# Patient Record
Sex: FEMALE | Race: WHITE
[De-identification: names, ages, dates, MRNs, and addresses within clinical notes are randomized per-mention and may not be internally consistent; named-entity substitution may affect disease eponyms.]

---

## 2019-03-18 ENCOUNTER — HOSPITAL ENCOUNTER (INPATIENT)
Dept: HOSPITAL 75 - LDRP | Age: 19
LOS: 1 days | Discharge: HOME | End: 2019-03-19
Attending: OBSTETRICS & GYNECOLOGY | Admitting: OBSTETRICS & GYNECOLOGY
Payer: MEDICAID

## 2019-03-18 VITALS — DIASTOLIC BLOOD PRESSURE: 71 MMHG | SYSTOLIC BLOOD PRESSURE: 114 MMHG

## 2019-03-18 VITALS — DIASTOLIC BLOOD PRESSURE: 65 MMHG | SYSTOLIC BLOOD PRESSURE: 115 MMHG

## 2019-03-18 VITALS — SYSTOLIC BLOOD PRESSURE: 107 MMHG | DIASTOLIC BLOOD PRESSURE: 69 MMHG

## 2019-03-18 VITALS — DIASTOLIC BLOOD PRESSURE: 63 MMHG | SYSTOLIC BLOOD PRESSURE: 114 MMHG

## 2019-03-18 VITALS — SYSTOLIC BLOOD PRESSURE: 109 MMHG | DIASTOLIC BLOOD PRESSURE: 65 MMHG

## 2019-03-18 VITALS — DIASTOLIC BLOOD PRESSURE: 68 MMHG | SYSTOLIC BLOOD PRESSURE: 117 MMHG

## 2019-03-18 VITALS — DIASTOLIC BLOOD PRESSURE: 62 MMHG | SYSTOLIC BLOOD PRESSURE: 109 MMHG

## 2019-03-18 VITALS — DIASTOLIC BLOOD PRESSURE: 68 MMHG | SYSTOLIC BLOOD PRESSURE: 107 MMHG

## 2019-03-18 VITALS — DIASTOLIC BLOOD PRESSURE: 77 MMHG | SYSTOLIC BLOOD PRESSURE: 116 MMHG

## 2019-03-18 VITALS — SYSTOLIC BLOOD PRESSURE: 120 MMHG | DIASTOLIC BLOOD PRESSURE: 72 MMHG

## 2019-03-18 VITALS — DIASTOLIC BLOOD PRESSURE: 57 MMHG | SYSTOLIC BLOOD PRESSURE: 104 MMHG

## 2019-03-18 VITALS — SYSTOLIC BLOOD PRESSURE: 111 MMHG | DIASTOLIC BLOOD PRESSURE: 55 MMHG

## 2019-03-18 VITALS — SYSTOLIC BLOOD PRESSURE: 112 MMHG | DIASTOLIC BLOOD PRESSURE: 59 MMHG

## 2019-03-18 VITALS — SYSTOLIC BLOOD PRESSURE: 107 MMHG | DIASTOLIC BLOOD PRESSURE: 62 MMHG

## 2019-03-18 VITALS — DIASTOLIC BLOOD PRESSURE: 59 MMHG | SYSTOLIC BLOOD PRESSURE: 103 MMHG

## 2019-03-18 VITALS — DIASTOLIC BLOOD PRESSURE: 56 MMHG | SYSTOLIC BLOOD PRESSURE: 101 MMHG

## 2019-03-18 VITALS — DIASTOLIC BLOOD PRESSURE: 72 MMHG | SYSTOLIC BLOOD PRESSURE: 111 MMHG

## 2019-03-18 VITALS — DIASTOLIC BLOOD PRESSURE: 68 MMHG | SYSTOLIC BLOOD PRESSURE: 111 MMHG

## 2019-03-18 VITALS — DIASTOLIC BLOOD PRESSURE: 67 MMHG | SYSTOLIC BLOOD PRESSURE: 117 MMHG

## 2019-03-18 VITALS — SYSTOLIC BLOOD PRESSURE: 83 MMHG | DIASTOLIC BLOOD PRESSURE: 40 MMHG

## 2019-03-18 VITALS — SYSTOLIC BLOOD PRESSURE: 113 MMHG | DIASTOLIC BLOOD PRESSURE: 66 MMHG

## 2019-03-18 VITALS — DIASTOLIC BLOOD PRESSURE: 46 MMHG | SYSTOLIC BLOOD PRESSURE: 92 MMHG

## 2019-03-18 VITALS — DIASTOLIC BLOOD PRESSURE: 63 MMHG | SYSTOLIC BLOOD PRESSURE: 117 MMHG

## 2019-03-18 VITALS — SYSTOLIC BLOOD PRESSURE: 111 MMHG | DIASTOLIC BLOOD PRESSURE: 69 MMHG

## 2019-03-18 VITALS — SYSTOLIC BLOOD PRESSURE: 115 MMHG | DIASTOLIC BLOOD PRESSURE: 66 MMHG

## 2019-03-18 VITALS — SYSTOLIC BLOOD PRESSURE: 113 MMHG | DIASTOLIC BLOOD PRESSURE: 68 MMHG

## 2019-03-18 VITALS — DIASTOLIC BLOOD PRESSURE: 64 MMHG | SYSTOLIC BLOOD PRESSURE: 110 MMHG

## 2019-03-18 VITALS — DIASTOLIC BLOOD PRESSURE: 69 MMHG | SYSTOLIC BLOOD PRESSURE: 121 MMHG

## 2019-03-18 VITALS — HEIGHT: 66 IN | WEIGHT: 213.01 LBS | BODY MASS INDEX: 34.23 KG/M2

## 2019-03-18 VITALS — SYSTOLIC BLOOD PRESSURE: 115 MMHG | DIASTOLIC BLOOD PRESSURE: 69 MMHG

## 2019-03-18 VITALS — SYSTOLIC BLOOD PRESSURE: 114 MMHG | DIASTOLIC BLOOD PRESSURE: 68 MMHG

## 2019-03-18 VITALS — SYSTOLIC BLOOD PRESSURE: 112 MMHG | DIASTOLIC BLOOD PRESSURE: 57 MMHG

## 2019-03-18 VITALS — DIASTOLIC BLOOD PRESSURE: 70 MMHG | SYSTOLIC BLOOD PRESSURE: 118 MMHG

## 2019-03-18 VITALS — DIASTOLIC BLOOD PRESSURE: 52 MMHG | SYSTOLIC BLOOD PRESSURE: 101 MMHG

## 2019-03-18 VITALS — DIASTOLIC BLOOD PRESSURE: 65 MMHG | SYSTOLIC BLOOD PRESSURE: 111 MMHG

## 2019-03-18 VITALS — SYSTOLIC BLOOD PRESSURE: 109 MMHG | DIASTOLIC BLOOD PRESSURE: 58 MMHG

## 2019-03-18 VITALS — DIASTOLIC BLOOD PRESSURE: 76 MMHG | SYSTOLIC BLOOD PRESSURE: 117 MMHG

## 2019-03-18 VITALS — SYSTOLIC BLOOD PRESSURE: 101 MMHG | DIASTOLIC BLOOD PRESSURE: 59 MMHG

## 2019-03-18 VITALS — DIASTOLIC BLOOD PRESSURE: 60 MMHG | SYSTOLIC BLOOD PRESSURE: 108 MMHG

## 2019-03-18 VITALS — SYSTOLIC BLOOD PRESSURE: 114 MMHG | DIASTOLIC BLOOD PRESSURE: 67 MMHG

## 2019-03-18 VITALS — DIASTOLIC BLOOD PRESSURE: 65 MMHG | SYSTOLIC BLOOD PRESSURE: 105 MMHG

## 2019-03-18 VITALS — SYSTOLIC BLOOD PRESSURE: 112 MMHG | DIASTOLIC BLOOD PRESSURE: 71 MMHG

## 2019-03-18 DIAGNOSIS — Z3A.39: ICD-10-CM

## 2019-03-18 LAB
APTT PPP: YELLOW S
BACTERIA #/AREA URNS HPF: (no result) /HPF
BASOPHILS # BLD AUTO: 0 10^3/UL (ref 0–0.1)
BASOPHILS NFR BLD AUTO: 0 % (ref 0–10)
BILIRUB UR QL STRIP: NEGATIVE
EOSINOPHIL # BLD AUTO: 0.3 10^3/UL (ref 0–0.3)
EOSINOPHIL NFR BLD AUTO: 3 % (ref 0–10)
ERYTHROCYTE [DISTWIDTH] IN BLOOD BY AUTOMATED COUNT: 12.6 % (ref 10–14.5)
FIBRINOGEN PPP-MCNC: CLEAR MG/DL
GLUCOSE UR STRIP-MCNC: NEGATIVE MG/DL
HCT VFR BLD CALC: 34 % (ref 35–52)
HGB BLD-MCNC: 11.6 G/DL (ref 11.5–16)
KETONES UR QL STRIP: NEGATIVE
LEUKOCYTE ESTERASE UR QL STRIP: (no result)
LYMPHOCYTES # BLD AUTO: 3 X 10^3 (ref 1–4)
LYMPHOCYTES NFR BLD AUTO: 27 % (ref 12–44)
MANUAL DIFFERENTIAL PERFORMED BLD QL: NO
MCH RBC QN AUTO: 29 PG (ref 25–34)
MCHC RBC AUTO-ENTMCNC: 34 G/DL (ref 32–36)
MCV RBC AUTO: 84 FL (ref 80–99)
MONOCYTES # BLD AUTO: 1 X 10^3 (ref 0–1)
MONOCYTES NFR BLD AUTO: 9 % (ref 0–12)
NEUTROPHILS # BLD AUTO: 7 X 10^3 (ref 1.8–7.8)
NEUTROPHILS NFR BLD AUTO: 61 % (ref 42–75)
NITRITE UR QL STRIP: NEGATIVE
PH UR STRIP: 6.5 [PH] (ref 5–9)
PLATELET # BLD: 329 10^3/UL (ref 130–400)
PMV BLD AUTO: 10.6 FL (ref 7.4–10.4)
PROT UR QL STRIP: (no result)
RBC #/AREA URNS HPF: (no result) /HPF
SP GR UR STRIP: 1.01 (ref 1.02–1.02)
UROBILINOGEN UR-MCNC: 1 MG/DL
WBC # BLD AUTO: 11.4 10^3/UL (ref 4.3–11)
WBC #/AREA URNS HPF: (no result) /HPF

## 2019-03-18 PROCEDURE — 85025 COMPLETE CBC W/AUTO DIFF WBC: CPT

## 2019-03-18 PROCEDURE — 90715 TDAP VACCINE 7 YRS/> IM: CPT

## 2019-03-18 PROCEDURE — 81000 URINALYSIS NONAUTO W/SCOPE: CPT

## 2019-03-18 PROCEDURE — 86900 BLOOD TYPING SEROLOGIC ABO: CPT

## 2019-03-18 PROCEDURE — 3E033VJ INTRODUCTION OF OTHER HORMONE INTO PERIPHERAL VEIN, PERCUTANEOUS APPROACH: ICD-10-PCS | Performed by: OBSTETRICS & GYNECOLOGY

## 2019-03-18 PROCEDURE — 86901 BLOOD TYPING SEROLOGIC RH(D): CPT

## 2019-03-18 PROCEDURE — 90686 IIV4 VACC NO PRSV 0.5 ML IM: CPT

## 2019-03-18 PROCEDURE — 90471 IMMUNIZATION ADMIN: CPT

## 2019-03-18 PROCEDURE — 36415 COLL VENOUS BLD VENIPUNCTURE: CPT

## 2019-03-18 PROCEDURE — 86850 RBC ANTIBODY SCREEN: CPT

## 2019-03-18 RX ADMIN — IBUPROFEN SCH MG: 800 TABLET, FILM COATED ORAL at 22:10

## 2019-03-18 RX ADMIN — DOCUSATE SODIUM SCH MG: 100 CAPSULE ORAL at 22:10

## 2019-03-18 RX ADMIN — SODIUM CHLORIDE, SODIUM LACTATE, POTASSIUM CHLORIDE, AND CALCIUM CHLORIDE SCH MLS/HR: 600; 310; 30; 20 INJECTION, SOLUTION INTRAVENOUS at 05:48

## 2019-03-18 RX ADMIN — SODIUM CHLORIDE, SODIUM LACTATE, POTASSIUM CHLORIDE, AND CALCIUM CHLORIDE SCH MLS/HR: 600; 310; 30; 20 INJECTION, SOLUTION INTRAVENOUS at 11:36

## 2019-03-18 RX ADMIN — IBUPROFEN SCH MG: 800 TABLET, FILM COATED ORAL at 15:00

## 2019-03-18 NOTE — NUR
pt transferred to room 312 via w/c with standby assist x2. Rt.leg remains heavy, unable to 
move without assist.

## 2019-03-18 NOTE — NUR
FFu/1. moderate-lt rubra noted. no clots expressed. cory-care offered. v-pad in place.  Lt. 
leg remains heavy. denies c/o's @ time.

## 2019-03-18 NOTE — HISTORY & PHYSICAL-OB
OB - Chief Complaint & HPI


Date/Time


Date of Admission:


Date of Admission:  Mar 18, 2019 at 04:30


Date seen by a Provider:  Mar 18, 2019


Time Seen by a Provider:  07:35





Chief Complaint/History


OB-Reason for Admission/Chief:  Induction of Labor


Hx :  2


Hx Para:  1


Expected Date of Delivery:  Mar 23, 2019


Gestational Age in Weeks:  39


Indication for induction:  other


Admission Nurse Assessment Rev:  Yes





Allergies and Home Medications


Allergies


Coded Allergies:  


     No Known Drug Allergies (Unverified , 3/18/19)





Patient Home Medication List


Home Medication List Reviewed:  Yes





OB - History


Hx of Present Pregnancy


Prenatal Care:  Yes


Ultrasounds:  Normal mid trimester US


Obstetrical Complications:  None


Medical Complications:  None





Prenatal Information


Pregnancy Induced Hypertension:  No


Maternal Gestational Diabetes:  No


Postpartum Hemorrhage:  No





Obstetrical History


Hx :  2


Hx Para:  1


Hx # Term Pregnancies:  1


Hx #  Pregnancies:  0


Number of Living Children:  1


Hx Pregnancy Termination:  No


Hx Multiple Gestation:  No


Hx Ectopic Pregnancy:  No


Hx Stillbirth:  No


Hx Pregnancy Complication:  No


Hx Pregnancy Induced Hypertens:  No


Hx Maternal Gestational Diabet:  No


Hx Postpartum Hemorrhage:  No





Delivery History


Hx Dystocia:  No


Hx Forceps Assisted Delivery:  No


Hx Vacuum Extraction Assisted:  No


Hx Placenta Abnormality:  No


Hx Fetal Distress:  No


Hx Large For Gestational Age I:  No


Hx Small for Gestational Age I:  No


Hx  Section:  No


Hx Vaginal Delivery Post C-Sec:  No


Hx Blood Disorders:  No


Adverse Rxn to Tranfusion:  No





Patient Past Medical History





Unremarkable





Social History/Family History


HIV/AIDS:  No


Recent Infectious Disease Expo:  No


Sexually Transmitted Disease:  No


Alcohol Use:  Denies Use


Recreational Drug Use:  No


Smoking Cessation:  Unknown if ever smoked


2nd Hand Smoke Exposure:  No





Immunizations


Hepatitis A:  No


Hepatitis B:  No


Tetanus Booster (TDap):  Unknown


Rubella:  immune


RPR/VDRL:  Negative


GBS Status:  Negative


HBsAG:  Negative





OB - Admission Exam


Physical Exam


HEENT:  NCAT


Heart:  Rhythm Normal


Lungs:  Clear


Abdomen:  Gravid


Extremities:  Edema (Minimal)


Reflexes:  Normal


Cervical Dilatation:  4cm


Effacement:  75%


Station:  -2


Membranes:  Intact


Amniotic Fluid:  Clear


Fetal Heart Rate:  140's


Accelerations:  Accelerations Present


Decelerations:  No Decelerations


Short Term Variability:  Present


Long Term Variability:  Average (6-25)


Contractions on Admission:  6-10 Minutes Apart


Intensity:  Moderate





Lloyd Scoring Tool (Modified)


Dilation (cm):  3-4cm (2)


Effacement (%):  51-79%  (2)


Fetal Descent/Station:  -2        (1)


Cervix Consistency:  Medium(1)


Cervix Position:  Middle/Mid-Position  (1)


Add 1 point for:  Each previous vaginal delivery (1)


Lloyd Score:  8


Labs





Laboratory Tests








Test


 3/18/19


05:00 3/18/19


05:56 Range/Units


 


 


White Blood Count


 11.4 H


 


 4.3-11.0


10^3/uL


 


Red Blood Count


 4.05 L


 


 4.35-5.85


10^6/uL


 


Hemoglobin 11.6   11.5-16.0  G/DL


 


Hematocrit 34 L  35-52  %


 


Mean Corpuscular Volume 84   80-99  FL


 


Mean Corpuscular Hemoglobin 29   25-34  PG


 


Mean Corpuscular Hemoglobin


Concent 34 


 


 32-36  G/DL





 


Red Cell Distribution Width 12.6   10.0-14.5  %


 


Platelet Count


 329 


 


 130-400


10^3/uL


 


Mean Platelet Volume 10.6 H  7.4-10.4  FL


 


Neutrophils (%) (Auto) 61   42-75  %


 


Lymphocytes (%) (Auto) 27   12-44  %


 


Monocytes (%) (Auto) 9   0-12  %


 


Eosinophils (%) (Auto) 3   0-10  %


 


Basophils (%) (Auto) 0   0-10  %


 


Neutrophils # (Auto) 7.0   1.8-7.8  X 10^3


 


Lymphocytes # (Auto) 3.0   1.0-4.0  X 10^3


 


Monocytes # (Auto) 1.0   0.0-1.0  X 10^3


 


Eosinophils # (Auto)


 0.3 


 


 0.0-0.3


10^3/uL


 


Basophils # (Auto)


 0.0 


 


 0.0-0.1


10^3/uL


 


Urine Color  YELLOW   


 


Urine Clarity  CLEAR   


 


Urine pH  6.5  5-9  


 


Urine Specific Gravity  1.010 L 1.016-1.022  


 


Urine Protein  1+ H NEGATIVE  


 


Urine Glucose (UA)  NEGATIVE  NEGATIVE  


 


Urine Ketones  NEGATIVE  NEGATIVE  


 


Urine Nitrite  NEGATIVE  NEGATIVE  


 


Urine Bilirubin  NEGATIVE  NEGATIVE  


 


Urine Urobilinogen  1  NORMAL  MG/DL


 


Urine Leukocyte Esterase  1+ H NEGATIVE  


 


Urine RBC (Auto)  NEGATIVE  NEGATIVE  


 


Urine RBC  NONE   /HPF


 


Urine WBC  0-2   /HPF


 


Urine Squamous Epithelial


Cells 


 10-25 H


  /HPF





 


Urine Crystals  NONE   /LPF


 


Urine Bacteria  FEW H  /HPF


 


Urine Casts  NONE   /LPF


 


Urine Mucus  NEGATIVE   /LPF


 


Urine Culture Indicated  NO   











OB - Assessment/Plan/Diagnosis


Assessment


Assessment:  induction of labor


Admission Dx


Intrauterine Pregnancy at 39 2/7 weeks


Admission Status:  Inpatient Order (span 2 midnights)


Reason for Inpatient Admission:  


Intrauterine Pregnant expected to deliver today





Plan


Plan:  Induction


Induction Method:  per Pitocin Protocol











SHAD GARCIA DO Mar 18, 2019 07:41

## 2019-03-18 NOTE — OB LABOR & DELIVERY RECORD
Vag Delivery Note


Vag Delivery Note


Date of Delivery: 3/18/19 





Preoperative Diagnosis: Jammie Canales  is a (18  /Para  2 / 1,Gestational 

Age (wks)39with [2/]





Postoperative Diagnosis: Same





Surgeon: SHAD GARCIA 





Assistant: []





Anesthesia: [Epidural]





Delivery Type: [Normal Spontaneous Vaginal Delivery with Midline Episiotomy and 

Standard Repair]





Findings: [Male]


Viable [] infant, apgars [], weight []


Lacerations:


Intact placenta with 3 vessel cord. No nuchal cord, body cord or shoulder 

dystocia





Estimated Blood Loss: [300] ml





Complications: None





Condition: Stable





Description of Procedure:





The patient is a 18 year old female who presented [for induction of labor]. She 

was admitted and informed consent was obtained. Her labor course was 

unremarkable . She progressed to complete dilatation and began to push. 





She was then set up for delivery. The infant's head was delivered 

atraumatically in the [DOMINIC] position. The shoulders and remainder of the infant'

s body were then delivered without difficulty. Upon delivery, the head was held 

below the level of the perineum and the mouth and nares were bulb suctioned. 

The cord was doubly clamped and cut and the infant was handed off to the 

pediatric staff. An intact placenta with 3-vessel cord delivered via Marvin 

and there was found to be minimal bleeding.~ Vigorous fundal massage was 

performed and the fundus was found to be firm. IV oxytocin was given. 

Examination of the vagina and perineum revealed a [midline episiotomy with no 

extension] laceration repaired in the usual fashion with 2-0 and 3-0 vicryl 

suture. Following the repair, sponge, instrument and needle counts were 

correct. Mom and baby were both in stable condition in the labor suite.





Vitals - Labs


Vital Signs - I&O





Vital Signs








  Date Time  Temp Pulse Resp B/P (MAP) Pulse Ox O2 Delivery O2 Flow Rate FiO2


 


3/18/19 07:00  80 18 115/9 (44)  Room Air  


 


3/18/19 06:30  75 18 107/69 (82)  Room Air  


 


3/18/19 06:15  83 18 107/68 (81)  Room Air  


 


3/18/19 06:00  82 18 111/68 (82)  Room Air  


 


3/18/19 05:45 97.9 83 18 115/66 (82)  Room Air  


 


3/18/19 04:55  82 18 117/67 (84)  Room Air  











Labs


Laboratory Tests


3/18/19 05:00: 


White Blood Count 11.4H, Red Blood Count 4.05L, Hemoglobin 11.6, Hematocrit 34L

, Mean Corpuscular Volume 84, Mean Corpuscular Hemoglobin 29, Mean Corpuscular 

Hemoglobin Concent 34, Red Cell Distribution Width 12.6, Platelet Count 329, 

Mean Platelet Volume 10.6H, Neutrophils (%) (Auto) 61, Lymphocytes (%) (Auto) 27

, Monocytes (%) (Auto) 9, Eosinophils (%) (Auto) 3, Basophils (%) (Auto) 0, 

Neutrophils # (Auto) 7.0, Lymphocytes # (Auto) 3.0, Monocytes # (Auto) 1.0, 

Eosinophils # (Auto) 0.3, Basophils # (Auto) 0.0


3/18/19 05:56: 


Urine Color YELLOW, Urine Clarity CLEAR, Urine pH 6.5, Urine Specific Gravity 

1.010L, Urine Protein 1+H, Urine Glucose (UA) NEGATIVE, Urine Ketones NEGATIVE, 

Urine Nitrite NEGATIVE, Urine Bilirubin NEGATIVE, Urine Urobilinogen 1, Urine 

Leukocyte Esterase 1+H, Urine RBC (Auto) NEGATIVE, Urine RBC NONE, Urine WBC 0-2

, Urine Squamous Epithelial Cells 10-25H, Urine Crystals NONE, Urine Bacteria 

FEWH, Urine Casts NONE, Urine Mucus NEGATIVE, Urine Culture Indicated NO











SEALSSHAD DO Mar 18, 2019 12:53

## 2019-03-18 NOTE — NUR
LATRICE ROSAS presented to unit via wheelchair from ED, accompanied by s/o, for INDUCTION. 
LATRICE ROSAS weighed, gowned, voided, and to bed.  EFHM and TOCO applied, VS taken.  
LATRICE ROSAS oriented to bed controls, call light, TV, heat, and A/C controls.

## 2019-03-19 VITALS — SYSTOLIC BLOOD PRESSURE: 111 MMHG | DIASTOLIC BLOOD PRESSURE: 59 MMHG

## 2019-03-19 VITALS — SYSTOLIC BLOOD PRESSURE: 108 MMHG | DIASTOLIC BLOOD PRESSURE: 65 MMHG

## 2019-03-19 VITALS — DIASTOLIC BLOOD PRESSURE: 62 MMHG | SYSTOLIC BLOOD PRESSURE: 100 MMHG

## 2019-03-19 VITALS — SYSTOLIC BLOOD PRESSURE: 113 MMHG | DIASTOLIC BLOOD PRESSURE: 73 MMHG

## 2019-03-19 LAB
BASOPHILS # BLD AUTO: 0 10^3/UL (ref 0–0.1)
BASOPHILS NFR BLD AUTO: 0 % (ref 0–10)
EOSINOPHIL # BLD AUTO: 0.4 10^3/UL (ref 0–0.3)
EOSINOPHIL NFR BLD AUTO: 3 % (ref 0–10)
ERYTHROCYTE [DISTWIDTH] IN BLOOD BY AUTOMATED COUNT: 12.5 % (ref 10–14.5)
HCT VFR BLD CALC: 31 % (ref 35–52)
HGB BLD-MCNC: 10.2 G/DL (ref 11.5–16)
LYMPHOCYTES # BLD AUTO: 3.7 X 10^3 (ref 1–4)
LYMPHOCYTES NFR BLD AUTO: 27 % (ref 12–44)
MANUAL DIFFERENTIAL PERFORMED BLD QL: NO
MCH RBC QN AUTO: 29 PG (ref 25–34)
MCHC RBC AUTO-ENTMCNC: 33 G/DL (ref 32–36)
MCV RBC AUTO: 86 FL (ref 80–99)
MONOCYTES # BLD AUTO: 1.2 X 10^3 (ref 0–1)
MONOCYTES NFR BLD AUTO: 9 % (ref 0–12)
NEUTROPHILS # BLD AUTO: 8.5 X 10^3 (ref 1.8–7.8)
NEUTROPHILS NFR BLD AUTO: 61 % (ref 42–75)
PLATELET # BLD: 261 10^3/UL (ref 130–400)
PMV BLD AUTO: 10.2 FL (ref 7.4–10.4)
WBC # BLD AUTO: 13.9 10^3/UL (ref 4.3–11)

## 2019-03-19 RX ADMIN — IBUPROFEN SCH MG: 800 TABLET, FILM COATED ORAL at 04:30

## 2019-03-19 RX ADMIN — IBUPROFEN SCH MG: 800 TABLET, FILM COATED ORAL at 15:49

## 2019-03-19 RX ADMIN — DOCUSATE SODIUM SCH MG: 100 CAPSULE ORAL at 09:50

## 2019-03-19 RX ADMIN — IBUPROFEN SCH MG: 800 TABLET, FILM COATED ORAL at 09:51

## 2019-03-19 NOTE — DISCHARGE INST-WOMEN'S SERVICE
Discharge Inst-Women's Serv


Depart Medication/Instructions


New, Converted or Re-Newed RX:  RX Given to Pt/Family


Instructions


Pelvic rest x 6 weeks.  No heavy lifting, less than 20 lbs.  No strenuous 

activities.  Call with problems or questions.


Final Diagnosis


Intrauterine Pregnancy at 39 2/7 weeks--delivered





Activity


Activity:  Activity as Tolerated


Driving Instructions:  No Driving for 1 Week


NO SMOKING:  NO SMOKING


Nothing Inside Vagina:  No Douching, No [place], No Tampons





Diet


Discharge Diet:  No Restrictions


Symptoms to Report to :  Bleeding Excessive, Pain Increased, Fever Over 101 

Degrees F, Vaginal Bleeding Increase


For Any Problems or Questions:  Contact Your Physician





Skin/Wound Care


Infection Signs and Symptoms:  Foul Odor of Wound, Increased Drainage, 

Temperature Above 101  F


Operative Area Clean and Dry:  Keep Incision Clean/Dry


Stitches/Staples/Dermabond:  Care of Stitches


Bathing Instructions:  SHAD Stallings DO Mar 19, 2019 06:29

## 2019-03-19 NOTE — NUR
DISCHARGE INSTRUCTIONS REVIEWED WITH COPY TO PT. RXS GIVEN EARLIER FOR SPOUSE TO HAVE 
FILLED. STATES UNDERSTANDING OF ALL INSTRUCTIONS AND NEED TO F/U AS SCHEDULED AND AS NEEDED. 
PT DISMISSED FROM WS IN STABLE CONDITION TO ROOMING IN PARENT R/T INFANT HAVING TO STAY.

## 2019-03-19 NOTE — ANESTHESIA-REGIONAL POST-OP
Regional


Patient Condition


Mental Status:  Alert, Oriented x3


Circulation:  Same as Pre-Op


Headache:  Absent


Sensation:  Full Recovery


Motor Block:  Absent





Post Op Complications


Complications


None





Follow Up Care/Instructions


Patient Instructions


None needed.





Anesthesia/Patient Condition


Patient is doing well, no complaints, stable vital signs, no apparent adverse 

anesthesia problems.











ESTHER DE LEON DO Mar 19, 2019 12:37

## 2019-07-01 ENCOUNTER — HOSPITAL ENCOUNTER (EMERGENCY)
Dept: HOSPITAL 75 - ER FS | Age: 19
Discharge: HOME | End: 2019-07-01
Payer: SELF-PAY

## 2019-07-01 VITALS — BODY MASS INDEX: 29.82 KG/M2 | HEIGHT: 67 IN | WEIGHT: 190 LBS

## 2019-07-01 VITALS — DIASTOLIC BLOOD PRESSURE: 74 MMHG | SYSTOLIC BLOOD PRESSURE: 118 MMHG

## 2019-07-01 DIAGNOSIS — L02.211: ICD-10-CM

## 2019-07-01 DIAGNOSIS — S30.861A: Primary | ICD-10-CM

## 2019-07-01 DIAGNOSIS — Z90.49: ICD-10-CM
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
## 2019-07-01 PROCEDURE — 99283 EMERGENCY DEPT VISIT LOW MDM: CPT

## 2019-07-01 NOTE — ED INTEGUMENTARY GENERAL
General


Chief Complaint:  Bite-Animal/Human/Insect


Stated Complaint:  RT SIDE RIB CAGE BUG BITE/STING


Nursing Triage Note:  


BITE ON RIGHT SIDE, PAINFUL AND RED





History of Present Illness


Date Seen by Provider:  Jul 1, 2019


Time Seen by Provider:  19:21


Initial Comments


Patient believes she was bitten by something on Saturday which would be 2 days 

ago.  She said she felt a pinprick but did not see a spider or bug.  She says 

since that time the wound has become more erythematous and slightly harder and 

now she has a rash on her left arm and chest.  She says the rash is macular 

papular and pruritic.  She says she has had some headache and nausea but no 

fevers vomiting abdominal pain or other systemic symptoms.  She says she is 

healthy and her immunizations are up-to-date.  She says there has been some 

drainage from the wound and has been placing predominant with continued 

yellowish thick drainage although there is no drainage present on my exam.  She 

is in no obvious distress with normal vital signs.





Allergies and Home Medications


Allergies


Coded Allergies:  


     No Known Drug Allergies (Unverified , 3/18/19)





Home Medications


Docusate Sodium 100 Mg Capsule, 100 MG PO BID


   Prescribed by: SHAD GARCIA on 3/19/19 0632


Ibuprofen 800 Mg Tablet, 800 MG PO Q6H PRN for PAIN-MODERATE


   Prescribed by: SHAD GARCIA on 3/19/19 0632


Oxycodone HCl/Acetaminophen 1 Each Tablet, 1 TAB PO Q4HR PRN for PAIN-MODERATE


   Prescribed by: SHAD GARCIA on 3/19/19 0632





Patient Home Medication List


Home Medication List Reviewed:  Yes





Review of Systems


Review of Systems


Constitutional:  no symptoms reported


EENTM:  no symptoms reported


Respiratory:  no symptoms reported


Gastrointestinal:  No abdominal pain; nausea; No vomiting


Genitourinary:  no symptoms reported


Musculoskeletal:  no symptoms reported


Skin:  lesions, rash


Psychiatric/Neurological:  Headache





All Other Systems Reviewed


Negative Unless Noted:  Yes





Past Medical-Social-Family Hx


Patient Social History


2nd Hand Smoke Exposure:  No


Recent Foreign Travel:  No


Contact w/Someone Who Travel:  No


Recent Infectious Disease Expo:  No


Recent Hopitalizations:  No





Immunizations Up To Date


Tetanus Booster (TDap):  Unknown





Seasonal Allergies


Seasonal Allergies:  No





Past Medical History


Surgeries:  Yes (utheral dilitation as child)


Respiratory:  No


Cardiac:  No


Neurological:  No


Sexually Transmitted Disease:  No


HIV/AIDS:  No


Genitourinary:  No


Gastrointestinal:  No


Musculoskeletal:  No


Endocrine:  No


HEENT:  No


Cancer:  No


Psychosocial:  No


Integumentary:  No


Blood Disorders:  No


Adverse Reaction/Blood Tranf:  No





Family Medical History





Diabetes mellitus (MGM)


FH: heart disease (MGM)


HEART





Physical Exam


Vital Signs





Vital Signs - First Documented








 7/1/19





 19:10


 


Temp 98.2


 


Pulse 86


 


Resp 16


 


B/P (MAP) 118/74 (89)


 


Pulse Ox 98


 


O2 Delivery Room Air





Capillary Refill : Less Than 3 Seconds


General Appearance:  WD/WN, no apparent distress


Cardiovascular:  regular rate, rhythm


Respiratory:  no respiratory distress


Gastrointestinal:  normal bowel sounds, non tender, soft


Neurologic/Psychiatric:  alert, normal mood/affect, oriented x 3


Skin:  other (right lateral abdomen has approximate 1 x 1 cm area of induration 

but no drainage noted there is surrounding 5 x 5 cm erythema that is nontender 

to palpation.  Patient has macular papular rash on left inner arm and top of her

left chest as well.)





Progress/Results/Core Measures


Results/Orders


My Orders





Orders - JENNIFER MARTÍNEZ DO


Dexamethasone Tablet (Decadron Tablet) (7/1/19 19:15)





Vital Signs/I&O











 7/1/19





 19:10


 


Temp 98.2


 


Pulse 86


 


Resp 16


 


B/P (MAP) 118/74 (89)


 


Pulse Ox 98


 


O2 Delivery Room Air














Blood Pressure Mean:                    89











Progress


Progress Note :  


Progress Note


Patient with likely black  spider bite versus other insect bite wound 

however based off her feeling a pinprick and the rash present with some headache

and nausea I suspect she has a black  spider bite.  She does not appear 

toxic and she has no abdominal wall pain or rigidity.  She may have a reaction 

to it with possible secondary abscess and cellulitis due to the wound.   I 

recommended incision and drainage to evacuate any further abscess collection but

she refused stating that she rather be started on antibiotics for the abscess.  

I will give her a dose of Decadron here to help reduce systemic inflammation.  I

told her the black  spider bites can sometimes be toxic and required 

antivenom which required lab work and transfer.  She says she has a child home 

and does not want to go through any testing or further treatment at this time 

but would like to try the steroid and antibiotic and then she said she would 

return with any new worsening symptoms.  I told her what symptoms to look out 

for including worsening rash abdominal pain fevers vomiting neck stiffness or 

rigidity.  Patient aware and agreeable with plan for discharge and verbalized 

understanding of the need for short-term follow-up and strict ED return 

precautions discussed as above.





Departure


Impression





   Primary Impression:  


   Insect bite - wound


   Qualified Codes:  S30.861A - Insect bite (nonvenomous) of abdominal wall, 

   initial encounter; W57.XXXA - Bitten or stung by nonvenomous insect and other

   nonvenomous arthropods, initial encounter


   Additional Impressions:  


   Abdominal wall abscess


   Rash and nonspecific skin eruption


Disposition:  01 HOME, SELF-CARE


Condition:  Stable





Departure-Patient Inst.


Referrals:  


NO,LOCAL PHYSICIAN (PCP)


Primary Care Physician








SHAD GARCIA DO (Family)


Primary Care Physician


Patient Instructions:  Insect Bites and Stings





Add. Discharge Instructions:  


All discharge instructions reviewed with patient and/or family. Voiced 

understanding.





Take 600mg of ibuprofen every 6 hours for pain.  You can also take tylenol.  

Warm soaks 4 times daily.  Come back with any new or worsening symptoms.  Thank 

you!


Scripts


Sulfamethoxazole/Trimethoprim (Bactrim Ds Tablet) 1 Each Tablet


1 EACH PO BID, #14 TAB


   Prov: JENNIFER MARTÍNEZ DO         7/1/19











JENNIFER MARTÍNEZ DO              Jul 1, 2019 19:26

## 2019-07-02 NOTE — XMS REPORT
Continuity of Care Document

                             Created on: 2019



Jammie Canales

External Reference #: [id number]

: 2000

Sex: Female



Demographics







                          Address                   [address] [place] [address]  [address]

 

                          Home Phone                [phone] x

 

                          Preferred Language        Unknown

 

                          Marital Status            Unknown

 

                          Muslim Affiliation     Unknown

 

                          Race                      Unknown

 

                          Ethnic Group              Unknown





Author







                          Organization              Unknown

 

                          Address                   Unknown

 

                          Phone                     [phone]



              



Allergies

      



There is no data.                  



Medications

      



There is no data.                  



Problems

      



There is no data.                  



Procedures

      



There is no data.                  



Results

      



There is no data.              



Encounters

      





                ACCT No.              Visit Date/Time              Discharge              Status      

                Pt. Type              Provider              Facility              Loc./Unit      

                                        Complaint        

 

                [id number]              2019 13:30:00              2019 23:59:59              

[facility]              Outpatient              [facility]ANDRE LAC                              [facility]

 [facility]

## 2019-07-02 NOTE — XMS REPORT
[facility]

                             Created on: 2019



Jammie Canales

External Reference #: [id number]

: 2000

Sex: Female



Demographics







                          Address                   [address] [place] [address]  [phone]

 

                          Preferred Language        Unknown

 

                          Marital Status            Unknown

 

                          Buddhist Affiliation     Unknown

 

                          Race                      Unknown

 

                          Ethnic Group              Unknown





Author







                          Author                    SHAD GARCIA

 

                          Organization              [facility]

 

                          Address                   [address] [place]  [address]



 

                          Phone                     [phone]







Care Team Providers







                    Care Team Member Name    Role                Phone

 

                    RADHASHAD       Unavailable         [phone]







PROBLEMS

Unknown Problems



ALLERGIES

No Known Allergies



ENCOUNTERS







                Encounter       Location        Date            Diagnosis

 

                    [address] [address]                                        

 

                    [address] [address]    18 Mar,

 2019                                    

 

                    [address] [address]    12 Mar,

 2019                                   Morbid obesity E66.01 and Encounter for supervision of other normal pregnancy,

 third trimester Z34.83

 

                    [address] [address]    05 Mar,

 2019                                   Morbid obesity E66.01 and Encounter for supervision of other normal pregnancy,

 third trimester Z34.83

 

                    [address] [address]                                       Encounter for supervision of other normal pregnancy, third trimester Z34.83

 and BMI 45.0-49.9, adult Z68.42

 

                    [address] [address]                                        







IMMUNIZATIONS

No Known Immunizations



SOCIAL HISTORY

Never Assessed



REASON FOR VISIT

2 wk ob fu



PLAN OF CARE







                          Activity                  Details









                                         









                          Follow Up                 1 Week Reason:Return Obstetrical Visit







VITAL SIGNS







                    Height              56in in             2019

 

                    Weight              217 lbs             2019

 

                    Temperature         98.2 degrees Fahrenheit    2019

 

                    Heart Rate          78 bpm              2019

 

                    Oximetry            98 %                2019

 

                    BMI                 48.65 kg/m2         2019

 

                    Blood pressure systolic    124 mmHg            2019

 

                    Blood pressure diastolic    78 mmHg             2019







MEDICATIONS

Unknown Medications



RESULTS







                Name            Result          Date            Reference Range

 

                UA OB DIP (IN HOUSE)                                     

 

                Glucose         negative                         

 

                Protein         negative                         







PROCEDURES







                Procedure       Date Ordered    Result          Body Site

 

                URINE-NO MICRO    2019                     







INSTRUCTIONS





MEDICATIONS ADMINISTERED

No Known Medications

## 2020-10-16 ENCOUNTER — HOSPITAL ENCOUNTER (OUTPATIENT)
Dept: HOSPITAL 75 - LAB FS | Age: 20
End: 2020-10-16
Attending: FAMILY MEDICINE
Payer: MEDICAID

## 2020-10-16 DIAGNOSIS — Z34.82: Primary | ICD-10-CM

## 2020-10-16 LAB
BASOPHILS # BLD AUTO: 0 10^3/UL (ref 0–0.1)
BASOPHILS NFR BLD AUTO: 0 % (ref 0–10)
EOSINOPHIL # BLD AUTO: 0.4 10^3/UL (ref 0–0.3)
EOSINOPHIL NFR BLD AUTO: 3 % (ref 0–10)
HCT VFR BLD CALC: 33 % (ref 35–52)
HGB BLD-MCNC: 11 G/DL (ref 11.5–16)
LYMPHOCYTES # BLD AUTO: 2.1 X 10^3 (ref 1–4)
LYMPHOCYTES NFR BLD AUTO: 20 % (ref 12–44)
MANUAL DIFFERENTIAL PERFORMED BLD QL: NO
MCH RBC QN AUTO: 29 PG (ref 25–34)
MCHC RBC AUTO-ENTMCNC: 34 G/DL (ref 32–36)
MCV RBC AUTO: 86 FL (ref 80–99)
MONOCYTES # BLD AUTO: 0.5 X 10^3 (ref 0–1)
MONOCYTES NFR BLD AUTO: 5 % (ref 0–12)
NEUTROPHILS # BLD AUTO: 7.3 X 10^3 (ref 1.8–7.8)
NEUTROPHILS NFR BLD AUTO: 70 % (ref 42–75)
PLATELET # BLD: 313 10^3/UL (ref 130–400)
PMV BLD AUTO: 10.5 FL (ref 7.4–10.4)
WBC # BLD AUTO: 10.4 10^3/UL (ref 4.3–11)

## 2020-10-16 PROCEDURE — 86780 TREPONEMA PALLIDUM: CPT

## 2020-10-16 PROCEDURE — 82950 GLUCOSE TEST: CPT

## 2020-10-16 PROCEDURE — 36415 COLL VENOUS BLD VENIPUNCTURE: CPT

## 2020-10-16 PROCEDURE — 86850 RBC ANTIBODY SCREEN: CPT

## 2020-10-16 PROCEDURE — 85025 COMPLETE CBC W/AUTO DIFF WBC: CPT

## 2020-12-11 ENCOUNTER — HOSPITAL ENCOUNTER (OUTPATIENT)
Dept: HOSPITAL 75 - LABNPT | Age: 20
End: 2020-12-11
Attending: FAMILY MEDICINE
Payer: MEDICAID

## 2020-12-11 DIAGNOSIS — Z34.93: Primary | ICD-10-CM

## 2020-12-11 DIAGNOSIS — Z3A.00: ICD-10-CM

## 2020-12-11 PROCEDURE — 87081 CULTURE SCREEN ONLY: CPT

## 2021-01-04 ENCOUNTER — HOSPITAL ENCOUNTER (INPATIENT)
Dept: HOSPITAL 75 - LDRP | Age: 21
LOS: 1 days | Discharge: HOME | End: 2021-01-05
Attending: FAMILY MEDICINE | Admitting: FAMILY MEDICINE
Payer: MEDICAID

## 2021-01-04 VITALS — SYSTOLIC BLOOD PRESSURE: 69 MMHG | DIASTOLIC BLOOD PRESSURE: 38 MMHG

## 2021-01-04 VITALS — DIASTOLIC BLOOD PRESSURE: 63 MMHG | SYSTOLIC BLOOD PRESSURE: 101 MMHG

## 2021-01-04 VITALS — DIASTOLIC BLOOD PRESSURE: 35 MMHG | SYSTOLIC BLOOD PRESSURE: 71 MMHG

## 2021-01-04 VITALS — DIASTOLIC BLOOD PRESSURE: 64 MMHG | SYSTOLIC BLOOD PRESSURE: 115 MMHG

## 2021-01-04 VITALS — SYSTOLIC BLOOD PRESSURE: 106 MMHG | DIASTOLIC BLOOD PRESSURE: 72 MMHG

## 2021-01-04 VITALS — SYSTOLIC BLOOD PRESSURE: 106 MMHG | DIASTOLIC BLOOD PRESSURE: 70 MMHG

## 2021-01-04 VITALS — SYSTOLIC BLOOD PRESSURE: 110 MMHG | DIASTOLIC BLOOD PRESSURE: 68 MMHG

## 2021-01-04 VITALS — DIASTOLIC BLOOD PRESSURE: 74 MMHG | SYSTOLIC BLOOD PRESSURE: 130 MMHG

## 2021-01-04 VITALS — SYSTOLIC BLOOD PRESSURE: 106 MMHG | DIASTOLIC BLOOD PRESSURE: 62 MMHG

## 2021-01-04 VITALS — SYSTOLIC BLOOD PRESSURE: 106 MMHG | DIASTOLIC BLOOD PRESSURE: 67 MMHG

## 2021-01-04 VITALS — SYSTOLIC BLOOD PRESSURE: 105 MMHG | DIASTOLIC BLOOD PRESSURE: 73 MMHG

## 2021-01-04 VITALS — SYSTOLIC BLOOD PRESSURE: 110 MMHG | DIASTOLIC BLOOD PRESSURE: 63 MMHG

## 2021-01-04 VITALS — SYSTOLIC BLOOD PRESSURE: 111 MMHG | DIASTOLIC BLOOD PRESSURE: 69 MMHG

## 2021-01-04 VITALS — SYSTOLIC BLOOD PRESSURE: 114 MMHG | DIASTOLIC BLOOD PRESSURE: 68 MMHG

## 2021-01-04 VITALS — SYSTOLIC BLOOD PRESSURE: 103 MMHG | DIASTOLIC BLOOD PRESSURE: 65 MMHG

## 2021-01-04 VITALS — SYSTOLIC BLOOD PRESSURE: 118 MMHG | DIASTOLIC BLOOD PRESSURE: 74 MMHG

## 2021-01-04 VITALS — DIASTOLIC BLOOD PRESSURE: 66 MMHG | SYSTOLIC BLOOD PRESSURE: 100 MMHG

## 2021-01-04 VITALS — DIASTOLIC BLOOD PRESSURE: 68 MMHG | SYSTOLIC BLOOD PRESSURE: 113 MMHG

## 2021-01-04 VITALS — DIASTOLIC BLOOD PRESSURE: 79 MMHG | SYSTOLIC BLOOD PRESSURE: 121 MMHG

## 2021-01-04 VITALS — DIASTOLIC BLOOD PRESSURE: 78 MMHG | SYSTOLIC BLOOD PRESSURE: 124 MMHG

## 2021-01-04 VITALS — DIASTOLIC BLOOD PRESSURE: 66 MMHG | SYSTOLIC BLOOD PRESSURE: 104 MMHG

## 2021-01-04 VITALS — SYSTOLIC BLOOD PRESSURE: 89 MMHG | DIASTOLIC BLOOD PRESSURE: 49 MMHG

## 2021-01-04 VITALS — HEIGHT: 65.75 IN | WEIGHT: 225.97 LBS | BODY MASS INDEX: 36.75 KG/M2

## 2021-01-04 VITALS — DIASTOLIC BLOOD PRESSURE: 59 MMHG | SYSTOLIC BLOOD PRESSURE: 105 MMHG

## 2021-01-04 VITALS — DIASTOLIC BLOOD PRESSURE: 57 MMHG | SYSTOLIC BLOOD PRESSURE: 101 MMHG

## 2021-01-04 VITALS — DIASTOLIC BLOOD PRESSURE: 61 MMHG | SYSTOLIC BLOOD PRESSURE: 105 MMHG

## 2021-01-04 VITALS — SYSTOLIC BLOOD PRESSURE: 115 MMHG | DIASTOLIC BLOOD PRESSURE: 67 MMHG

## 2021-01-04 VITALS — SYSTOLIC BLOOD PRESSURE: 99 MMHG | DIASTOLIC BLOOD PRESSURE: 67 MMHG

## 2021-01-04 VITALS — DIASTOLIC BLOOD PRESSURE: 70 MMHG | SYSTOLIC BLOOD PRESSURE: 118 MMHG

## 2021-01-04 VITALS — SYSTOLIC BLOOD PRESSURE: 102 MMHG | DIASTOLIC BLOOD PRESSURE: 53 MMHG

## 2021-01-04 VITALS — SYSTOLIC BLOOD PRESSURE: 103 MMHG | DIASTOLIC BLOOD PRESSURE: 61 MMHG

## 2021-01-04 VITALS — SYSTOLIC BLOOD PRESSURE: 106 MMHG | DIASTOLIC BLOOD PRESSURE: 68 MMHG

## 2021-01-04 VITALS — DIASTOLIC BLOOD PRESSURE: 63 MMHG | SYSTOLIC BLOOD PRESSURE: 104 MMHG

## 2021-01-04 VITALS — SYSTOLIC BLOOD PRESSURE: 113 MMHG | DIASTOLIC BLOOD PRESSURE: 68 MMHG

## 2021-01-04 VITALS — DIASTOLIC BLOOD PRESSURE: 71 MMHG | SYSTOLIC BLOOD PRESSURE: 111 MMHG

## 2021-01-04 VITALS — SYSTOLIC BLOOD PRESSURE: 114 MMHG | DIASTOLIC BLOOD PRESSURE: 78 MMHG

## 2021-01-04 VITALS — SYSTOLIC BLOOD PRESSURE: 115 MMHG | DIASTOLIC BLOOD PRESSURE: 53 MMHG

## 2021-01-04 VITALS — SYSTOLIC BLOOD PRESSURE: 93 MMHG | DIASTOLIC BLOOD PRESSURE: 55 MMHG

## 2021-01-04 VITALS — DIASTOLIC BLOOD PRESSURE: 70 MMHG | SYSTOLIC BLOOD PRESSURE: 112 MMHG

## 2021-01-04 VITALS — DIASTOLIC BLOOD PRESSURE: 68 MMHG | SYSTOLIC BLOOD PRESSURE: 124 MMHG

## 2021-01-04 VITALS — SYSTOLIC BLOOD PRESSURE: 117 MMHG | DIASTOLIC BLOOD PRESSURE: 56 MMHG

## 2021-01-04 VITALS — SYSTOLIC BLOOD PRESSURE: 98 MMHG | DIASTOLIC BLOOD PRESSURE: 58 MMHG

## 2021-01-04 VITALS — SYSTOLIC BLOOD PRESSURE: 102 MMHG | DIASTOLIC BLOOD PRESSURE: 69 MMHG

## 2021-01-04 VITALS — SYSTOLIC BLOOD PRESSURE: 103 MMHG | DIASTOLIC BLOOD PRESSURE: 56 MMHG

## 2021-01-04 VITALS — DIASTOLIC BLOOD PRESSURE: 56 MMHG | SYSTOLIC BLOOD PRESSURE: 105 MMHG

## 2021-01-04 VITALS — SYSTOLIC BLOOD PRESSURE: 110 MMHG | DIASTOLIC BLOOD PRESSURE: 70 MMHG

## 2021-01-04 VITALS — DIASTOLIC BLOOD PRESSURE: 57 MMHG | SYSTOLIC BLOOD PRESSURE: 89 MMHG

## 2021-01-04 VITALS — DIASTOLIC BLOOD PRESSURE: 73 MMHG | SYSTOLIC BLOOD PRESSURE: 119 MMHG

## 2021-01-04 VITALS — SYSTOLIC BLOOD PRESSURE: 104 MMHG | DIASTOLIC BLOOD PRESSURE: 66 MMHG

## 2021-01-04 DIAGNOSIS — Z3A.39: ICD-10-CM

## 2021-01-04 DIAGNOSIS — Z20.822: ICD-10-CM

## 2021-01-04 LAB
BASOPHILS # BLD AUTO: 0.1 10^3/UL (ref 0–0.1)
BASOPHILS NFR BLD AUTO: 1 % (ref 0–10)
EOSINOPHIL # BLD AUTO: 0.2 10^3/UL (ref 0–0.3)
EOSINOPHIL NFR BLD AUTO: 2 % (ref 0–10)
HCT VFR BLD CALC: 32 % (ref 35–52)
HGB BLD-MCNC: 10.3 G/DL (ref 11.5–16)
LYMPHOCYTES # BLD AUTO: 2.8 10^3/UL (ref 1–4)
LYMPHOCYTES NFR BLD AUTO: 26 % (ref 12–44)
MANUAL DIFFERENTIAL PERFORMED BLD QL: NO
MCH RBC QN AUTO: 26 PG (ref 25–34)
MCHC RBC AUTO-ENTMCNC: 32 G/DL (ref 32–36)
MCV RBC AUTO: 81 FL (ref 80–99)
MONOCYTES # BLD AUTO: 0.7 10^3/UL (ref 0–1)
MONOCYTES NFR BLD AUTO: 6 % (ref 0–12)
NEUTROPHILS # BLD AUTO: 6.8 10^3/UL (ref 1.8–7.8)
NEUTROPHILS NFR BLD AUTO: 65 % (ref 42–75)
PLATELET # BLD: 386 10^3/UL (ref 130–400)
PMV BLD AUTO: 11 FL (ref 9–12.2)
WBC # BLD AUTO: 10.6 10^3/UL (ref 4.3–11)

## 2021-01-04 PROCEDURE — 86900 BLOOD TYPING SEROLOGIC ABO: CPT

## 2021-01-04 PROCEDURE — 86901 BLOOD TYPING SEROLOGIC RH(D): CPT

## 2021-01-04 PROCEDURE — 87635 SARS-COV-2 COVID-19 AMP PRB: CPT

## 2021-01-04 PROCEDURE — 86850 RBC ANTIBODY SCREEN: CPT

## 2021-01-04 PROCEDURE — 10907ZC DRAINAGE OF AMNIOTIC FLUID, THERAPEUTIC FROM PRODUCTS OF CONCEPTION, VIA NATURAL OR ARTIFICIAL OPENING: ICD-10-PCS | Performed by: FAMILY MEDICINE

## 2021-01-04 PROCEDURE — 36415 COLL VENOUS BLD VENIPUNCTURE: CPT

## 2021-01-04 PROCEDURE — 85025 COMPLETE CBC W/AUTO DIFF WBC: CPT

## 2021-01-04 RX ADMIN — Medication SCH MLS/HR: at 16:51

## 2021-01-04 RX ADMIN — ACETAMINOPHEN SCH MG: 500 TABLET ORAL at 19:37

## 2021-01-04 RX ADMIN — DOCUSATE SODIUM SCH MG: 100 CAPSULE ORAL at 19:37

## 2021-01-04 RX ADMIN — Medication SCH MLS/HR: at 17:41

## 2021-01-04 RX ADMIN — IBUPROFEN SCH MG: 600 TABLET ORAL at 16:55

## 2021-01-04 NOTE — HISTORY & PHYSICAL-OB
OB - Chief Complaint & HPI


Date/Time


Date of Admission:


Date of Admission:  2021 at 05:39


Date seen by a Provider:  2021


Time Seen by a Provider:  16:20





Chief Complaint/History


OB-Reason for Admission/Chief:  Induction of Labor


Hx :  3


Hx Para:  2


Expected Date of Delivery:  2021


Gestational Age in Weeks:  39


Gestational Age in Days:  0





Allergies and Home Medications


Allergies


Coded Allergies:  


     No Known Drug Allergies (Unverified , 3/18/19)





Home Medications


Docusate Sodium 100 Mg Capsule, 100 MG PO BID


   Prescribed by: SHAD GARCIA on 3/19/19 0632


Ibuprofen 800 Mg Tablet, 800 MG PO Q6H PRN for PAIN-MODERATE


   Prescribed by: SHAD GARCIA on 3/19/19 0632


Oxycodone HCl/Acetaminophen 1 Each Tablet, 1 TAB PO Q4HR PRN for PAIN-MODERATE


   Prescribed by: SHAD GARCIA on 3/19/19 0632


Sulfamethoxazole/Trimethoprim 1 Each Tablet, 1 EACH PO BID


   Prescribed by: JENNIFER MARTÍNEZ on 19





Patient Home Medication List


Home Medication List Reviewed:  Yes





OB - History


Hx of Present Pregnancy


Prenatal Care:  Yes


Ultrasounds:  Normal mid trimester US


Obstetrical Complications:  None


Medical Complications:  None





Obstetrical History


Hx :  3


Hx Para:  2


Hx Pregnancy Termination:  No


Hx Total # of Abortions (Spona:  0


Hx Multiple Gestation:  No


Hx Stillbirth:  No


Hx Pregnancy Complication:  No


Hx Pregnancy Induced Hypertens:  No


Hx Maternal Gestational Diabet:  No





Delivery History


Hx Dystocia:  No


Hx Large For Gestational Age I:  No


Hx Small for Gestational Age I:  No


Hx  Section:  No


Hx Vaginal Delivery Post C-Sec:  No


Hx Blood Disorders:  No


Adverse Rxn to Tranfusion:  No





Patient Past Medical History





Unremarkable





Social History/Family History


HIV/AIDS:  No


Recent Infectious Disease Expo:  No


Sexually Transmitted Disease:  No


Alcohol Use:  Denies Use


Recreational Drug Use:  No


2nd Hand Smoke Exposure:  Yes





Immunizations


Hepatitis A:  No


Hepatitis B:  No


Tetanus Booster (TDap):  Unknown





OB - Admission Exam


Physical Exam


Vitals:





Vital Signs








 21





 10:38 11:05


 


Temp 36.4 


 


Pulse  86


 


Resp  20


 


B/P (MAP)  104/63 (77)


 


Pulse Ox  100


 


O2 Delivery  Room Air








HEENT:  NCAT


Heart:  Rhythm Normal


Lungs:  Clear


Abdomen:  Gravid


Extremities:  Normal


Reflexes:  Normal


Cervical Dilatation:  10cm


Effacement:  100%


Station:  +2


Membranes:  Ruptured


Amniotic Fluid:  Clear


Fetal Heart Rate:  130's


Accelerations:  Accelerations Present


Decelerations:  Early Decelerations


Short Term Variability:  Present


Long Term Variability:  Average (6-25)


Contractions on Admission:  None


Labs





Laboratory Tests








Test


 21


06:15 21


11:00 Range/Units


 


 


White Blood Count


 10.6 


 


 4.3-11.0


10^3/uL


 


Red Blood Count


 4.01 


 


 3.80-5.11


10^6/uL


 


Hemoglobin 10.3 L  11.5-16.0  g/dL


 


Hematocrit 32 L  35-52  %


 


Mean Corpuscular Volume 81   80-99  fL


 


Mean Corpuscular Hemoglobin 26   25-34  pg


 


Mean Corpuscular Hemoglobin


Concent 32 


 


 32-36  g/dL





 


Red Cell Distribution Width 12.8   10.0-14.5  %


 


Platelet Count


 386 


 


 130-400


10^3/uL


 


Mean Platelet Volume 11.0   9.0-12.2  fL


 


Immature Granulocyte % (Auto) 0    %


 


Neutrophils (%) (Auto) 65   42-75  %


 


Lymphocytes (%) (Auto) 26   12-44  %


 


Monocytes (%) (Auto) 6   0-12  %


 


Eosinophils (%) (Auto) 2   0-10  %


 


Basophils (%) (Auto) 1   0-10  %


 


Neutrophils # (Auto)


 6.8 


 


 1.8-7.8


10^3/uL


 


Lymphocytes # (Auto)


 2.8 


 


 1.0-4.0


10^3/uL


 


Monocytes # (Auto)


 0.7 


 


 0.0-1.0


10^3/uL


 


Eosinophils # (Auto)


 0.2 


 


 0.0-0.3


10^3/uL


 


Basophils # (Auto)


 0.1 


 


 0.0-0.1


10^3/uL


 


Immature Granulocyte # (Auto)


 0.0 


 


 0.0-0.1


10^3/uL











OB - Assessment/Plan/Diagnosis


Assessment


Assessment:  induction of labor


Admission Dx


Induction of labor at 39 0/7 wga.


Admission Status:  Inpatient Order (span 2 midnights)


Reason for Inpatient Admission:  


Induction.





Plan


Plan:  Induction


Other Plan


Pitocin.  AROM clear fluid.  Epidural for pain.  GBS positive- 2 doses of amp 

given.











ISAIAH SHUKLA MD              2021 16:31

## 2021-01-04 NOTE — OB LABOR & DELIVERY RECORD
Vag Delivery Note


Vag Delivery Note


Date of Delivery: 21 





Preoperative Diagnosis: Jammie Canales  is a (20  /Para  3 / 2,Gestational 

Age (wks)39with [0 days]





Postoperative Diagnosis: Same


Surgeon: ISAIAH SHUKLA 





Assistant: [none]





Anesthesia: [epidural]





Delivery Type: []





Findings: []


Viable [female] infant, apgars [8/9], weight [7 pounds 9 ounces]


Lacerations:


Intact placenta with 3 vessel cord. Loose body cord.





Estimated Blood Loss: [350] ml





Complications: None





Condition: Stable





Description of Procedure:





The patient is a 20 year old female who presented [for induction]. She was 

admitted and informed consent was obtained. Her labor course was remarkable for 

[nothing] She progressed to complete dilatation and began to push. 





She was then set up for delivery. The infant's head was delivered atraumatically

 in the [] position. The shoulders and remainder of the infant's body were then 

delivered without difficulty. Upon delivery, the head was held below the level 

of the perineum and the mouth and nares were bulb suctioned. The cord was doubly

 clamped and cut on maternal abdomen by father of the baby after 60 seconds. An 

intact placenta with 3-vessel cord delivered via Marvin and there was found to 

be minimal bleeding.~ Vigorous fundal massage was performed and the fundus was 

found to be firm. IV oxytocin was given. Examination of the vagina and perineum 

revealed a [1st degree perineal heomstatic] laceration which was not repaired. 

Following the repair, sponge, instrument and needle counts were correct. Mom and

baby were both in stable condition in the labor suite.





Vitals - Labs


Vital Signs - I&O





Vital Signs








  Date Time  Temp Pulse Resp B/P (MAP) Pulse Ox O2 Delivery O2 Flow Rate FiO2


 


21 11:05  86 20 104/63 (77) 100 Room Air  


 


21 11:00  82 20 89/57 (68) 99 Room Air  


 


21 10:55  91 20 69/38 (48) 100 Room Air  


 


21 10:50  95 20 115/53 (73) 100 Room Air  


 


21 10:47  86 20 98/58 (71) 100 Room Air  


 


21 10:45  89 20 101/63 (76) 100 Room Air  


 


21 10:40  86 20 101/57 (72) 100 Room Air  


 


21 10:38 36.4 66 20 71/35 (47) 100 Room Air  


 


21 10:35  101 20 89/49 (62) 100 Room Air  


 


21 10:30  102 20 106/62 (77) 100 Room Air  


 


21 10:15  89 20 118/70 (86) 100 Room Air  


 


21 10:00  75 20 105/59 (74)  Room Air  


 


21 09:45  83 20 113/68 (83)  Room Air  


 


21 09:30  85 20 114/78 (90)  Room Air  


 


21 09:15  81 20 115/64 (81)  Room Air  


 


21 09:00  81 20 110/63 (79)  Room Air  


 


21 08:45  78 20 106/68 (81)  Room Air  


 


21 08:30  81 20 110/70 (83)  Room Air  


 


21 08:15  84 20 113/68 (83)  Room Air  


 


21 08:13 36.4 75 20   Room Air  











Labs


Laboratory Tests


21 06:15: 


White Blood Count 10.6, Red Blood Count 4.01, Hemoglobin 10.3L, Hematocrit 32L, 

Mean Corpuscular Volume 81, Mean Corpuscular Hemoglobin 26, Mean Corpuscular 

Hemoglobin Concent 32, Red Cell Distribution Width 12.8, Platelet Count 386, 

Mean Platelet Volume 11.0, Immature Granulocyte % (Auto) 0, Neutrophils (%) 

(Auto) 65, Lymphocytes (%) (Auto) 26, Monocytes (%) (Auto) 6, Eosinophils (%) 

(Auto) 2, Basophils (%) (Auto) 1, Neutrophils # (Auto) 6.8, Lymphocytes # (Auto)

2.8, Monocytes # (Auto) 0.7, Eosinophils # (Auto) 0.2, Basophils # (Auto) 0.1, 

Immature Granulocyte # (Auto) 0.0


21 11:00: 











ISAIAH SHUKLA MD              2021 16:36

## 2021-01-04 NOTE — DISCHARGE INST-WOMEN'S SERVICE
Discharge Inst-Women's Serv


Depart Medication/Instructions


New, Converted or Re-Newed RX:  Transmitted to Pharmacy


Problems Reviewed?:  Yes





Consults/Follow Up


Orders/Referrals


Follow up with Dr. Shukla in 6 weeks.





Activity


Activity:  Activity as Tolerated


Driving Instructions:  You May Drive


NO SMOKING:  NO SMOKING


Nothing Inside Vagina:  No Douching, No [place], No Tampons





Diet


Discharge Diet:  No Restrictions


Symptoms to Report to :  Fever Over 101 Degrees F, Vaginal Bleeding Increase


For Any Problems or Questions:  Contact Your Physician











ISAIAH SHUKLA MD              Jan 4, 2021 16:37

## 2021-01-05 VITALS — DIASTOLIC BLOOD PRESSURE: 71 MMHG | SYSTOLIC BLOOD PRESSURE: 110 MMHG

## 2021-01-05 VITALS — DIASTOLIC BLOOD PRESSURE: 70 MMHG | SYSTOLIC BLOOD PRESSURE: 109 MMHG

## 2021-01-05 VITALS — SYSTOLIC BLOOD PRESSURE: 101 MMHG | DIASTOLIC BLOOD PRESSURE: 57 MMHG

## 2021-01-05 VITALS — DIASTOLIC BLOOD PRESSURE: 66 MMHG | SYSTOLIC BLOOD PRESSURE: 107 MMHG

## 2021-01-05 VITALS — DIASTOLIC BLOOD PRESSURE: 56 MMHG | SYSTOLIC BLOOD PRESSURE: 90 MMHG

## 2021-01-05 LAB
BASOPHILS # BLD AUTO: 0.1 10^3/UL (ref 0–0.1)
BASOPHILS NFR BLD AUTO: 1 % (ref 0–10)
EOSINOPHIL # BLD AUTO: 0.3 10^3/UL (ref 0–0.3)
EOSINOPHIL NFR BLD AUTO: 2 % (ref 0–10)
HCT VFR BLD CALC: 30 % (ref 35–52)
HGB BLD-MCNC: 9.4 G/DL (ref 11.5–16)
LYMPHOCYTES # BLD AUTO: 3.8 10^3/UL (ref 1–4)
LYMPHOCYTES NFR BLD AUTO: 31 % (ref 12–44)
MANUAL DIFFERENTIAL PERFORMED BLD QL: NO
MCH RBC QN AUTO: 26 PG (ref 25–34)
MCHC RBC AUTO-ENTMCNC: 31 G/DL (ref 32–36)
MCV RBC AUTO: 81 FL (ref 80–99)
MONOCYTES # BLD AUTO: 0.9 10^3/UL (ref 0–1)
MONOCYTES NFR BLD AUTO: 8 % (ref 0–12)
NEUTROPHILS # BLD AUTO: 7.3 10^3/UL (ref 1.8–7.8)
NEUTROPHILS NFR BLD AUTO: 59 % (ref 42–75)
PLATELET # BLD: 326 10^3/UL (ref 130–400)
PMV BLD AUTO: 10.3 FL (ref 9–12.2)
WBC # BLD AUTO: 12.5 10^3/UL (ref 4.3–11)

## 2021-01-05 RX ADMIN — IBUPROFEN SCH MG: 600 TABLET ORAL at 00:07

## 2021-01-05 RX ADMIN — IBUPROFEN SCH MG: 600 TABLET ORAL at 06:02

## 2021-01-05 RX ADMIN — IBUPROFEN SCH MG: 600 TABLET ORAL at 12:39

## 2021-01-05 RX ADMIN — DOCUSATE SODIUM SCH MG: 100 CAPSULE ORAL at 09:15

## 2021-01-05 RX ADMIN — IBUPROFEN SCH MG: 600 TABLET ORAL at 17:47

## 2021-01-05 RX ADMIN — ACETAMINOPHEN SCH MG: 500 TABLET ORAL at 02:57

## 2021-01-05 RX ADMIN — ACETAMINOPHEN SCH MG: 500 TABLET ORAL at 11:17

## 2021-01-05 NOTE — DISCHARGE SUMMARY
Diagnosis/Chief Complaint


Date of Admission


2021 at 05:39


Date of Discharge


2020


Admission Diagnosis


Admission Diagnosis


Induction of labor at 39 0/7 wga.





Discharge Diagnosis


Postpartum vaginal delivery at 39 0/7 wga.


Problems/Diagnosis:  


(1) Postpartum care following vaginal delivery


Assessment & Plan:  Patient did well.  Nursing well.  Bleeding controlled.





(2) Postpartum hemorrhage


Assessment & Plan:  Patient given 800 mcg rectal cytotec within an hour after 

delivery.  Hemoglobin stable.  








Discharge Summary-OBS


Procedures


None.


Discharge Physical Examination


Allergies:  


Coded Allergies:  


     No Known Drug Allergies (Unverified , 3/18/19)


Vitals & I&Os











Intake and Output 


 


 21





 23:59


 


Intake Total 500 ml


 


Balance 500 ml








Vital Sign - Last 12Hours








  Date Time  Temp Pulse Resp B/P (MAP) Pulse Ox O2 Delivery O2 Flow Rate FiO2


 


21 06:03 36.2 68 18 109/70 (83) 100 Room Air  








General Appearance:  Alert, Oriented X3


HEENT:  Atraumatic


Respiratory:  Clear to Auscultation


Cardiovascular:  Regular Rate


Abdominal:  Soft (fundus firm at umbilicus)


Extremities:  No Edema


Skin:  No Rashes


Neuro:  Normal Gait, Normal Speech





Hospital Course


Was the Problem List Reviewed?:  Yes


Uneventful postpartum course.  Hemoglobin 9.4 at discharge.


Labs


Laboratory Tests


21 11:00: Coronavirus (COVID-19)(PCR) Negative


21 06:15: 


White Blood Count 12.5H, Red Blood Count 3.69L, Hemoglobin 9.4L, Hematocrit 30L,

Mean Corpuscular Volume 81, Mean Corpuscular Hemoglobin 26, Mean Corpuscular 

Hemoglobin Concent 31L, Red Cell Distribution Width 12.9, Platelet Count 326, 

Mean Platelet Volume 10.3, Immature Granulocyte % (Auto) 0, Neutrophils (%) 

(Auto) 59, Lymphocytes (%) (Auto) 31, Monocytes (%) (Auto) 8, Eosinophils (%) 

(Auto) 2, Basophils (%) (Auto) 1, Neutrophils # (Auto) 7.3, Lymphocytes # (Auto)

3.8, Monocytes # (Auto) 0.9, Eosinophils # (Auto) 0.3, Basophils # (Auto) 0.1, 

Immature Granulocyte # (Auto) 0.0





Discharge


Instructions to patient/family


Please see electronic discharge instructions given to patient.


Discharge Medications


Reviewed and agree with Discharge Medication list on patient's Discharge 

Instruction sheet





Clinical Quality Measures


DVT/VTE Risk/Contraindication:


Risk Factor Score Per Nursin


RFS Level Per Nursing on Admit:  2=Moderate











ISAIAH SHUKLA MD              2021 07:08

## 2021-01-05 NOTE — ANESTHESIA-REGIONAL POST-OP
Regional


Patient Condition


Mental Status:  Alert, Oriented x3


Circulation:  Same as Pre-Op


Headache:  Absent


Sensation:  Full Recovery


Motor Block:  Absent





Post Op Complications


Complications


None





Follow Up Care/Instructions


Patient Instructions


None needed.





Anesthesia/Patient Condition


Patient is doing well, no complaints, stable vital signs, no apparent adverse 

anesthesia problems.   


No complications reported per nursing.











SARAH MEYER CRNA           Jan 5, 2021 06:55

## 2021-09-23 NOTE — NUR
3rd bag of pitocin hung as ordered for postpartum bleeding. reviewed plan of pitocin 
infusion with patient over next 4hrs. verbalized understanding. fundal massage. firmed with 
massage. U/0. small clot expressed. 75cc of additional blood post delivery. total ebl of 
650cc.  patient continues to deny dizziness or lightheadedness . vitals remain stable see 
flowsheet. infant remains at bedside. dinner trays served. no s/s of distress noted by this 
RN. patient's left leg remains heavy with limited ROM post epidural placement. continuing to 
monitor.
AM shift assessment completed and vital signs obtained, see interventions. Plan of care 
reviewed with patient. Patient verbalizes understanding and questions answered. Scheduled 
Colace PO given. Rhogam administered, see administration record. Patient refused TDAP. 
Shower supplies provided. Saline lock DC'd, tip intact.
ANESTHESIA TO BEDSIDE REVIEWING EPIDURAL PROCEDURE AND CONSENT OBTAINED.
Assessment completed, bleeding light. No clots noted. Pt denies any needs. s/o at bedside. 
Will continue to monitor.
Discharge instructions and medications reviewed with patient both written and verbally. 
Patient verbalizes understanding and questions answered. Prescription e-scribed to patient's 
pharmacy.
LATRICE ROSAS presented to unit via ambulation from ED, accompanied by s.o. for induction of 
labor. LATRICE ROSAS weighed, gowned, voided, and to bed.  EFHM and TOCO applied, VS taken.  
LATRICE ROSAS oriented to bed controls, call light, TV, heat, and A/C controls.
Patient discharged at this time via wheelchair and accompanied down to awaiting private 
vehicle by this RN. No signs or symptoms of distress noted.
Moderna dose 1 and 2